# Patient Record
Sex: FEMALE | Race: BLACK OR AFRICAN AMERICAN | NOT HISPANIC OR LATINO | URBAN - METROPOLITAN AREA
[De-identification: names, ages, dates, MRNs, and addresses within clinical notes are randomized per-mention and may not be internally consistent; named-entity substitution may affect disease eponyms.]

---

## 2020-01-15 ENCOUNTER — OUTPATIENT (OUTPATIENT)
Dept: INPATIENT UNIT | Facility: HOSPITAL | Age: 25
LOS: 1 days | Discharge: HOME | End: 2020-01-15

## 2020-01-15 VITALS — DIASTOLIC BLOOD PRESSURE: 57 MMHG | SYSTOLIC BLOOD PRESSURE: 104 MMHG | HEART RATE: 84 BPM

## 2020-01-15 VITALS
HEART RATE: 105 BPM | SYSTOLIC BLOOD PRESSURE: 120 MMHG | DIASTOLIC BLOOD PRESSURE: 69 MMHG | TEMPERATURE: 99 F | RESPIRATION RATE: 16 BRPM

## 2020-01-15 DIAGNOSIS — O03.9 COMPLETE OR UNSPECIFIED SPONTANEOUS ABORTION WITHOUT COMPLICATION: Chronic | ICD-10-CM

## 2020-01-15 DIAGNOSIS — O26.899 OTHER SPECIFIED PREGNANCY RELATED CONDITIONS, UNSPECIFIED TRIMESTER: ICD-10-CM

## 2020-01-15 DIAGNOSIS — Z3A.00 WEEKS OF GESTATION OF PREGNANCY NOT SPECIFIED: ICD-10-CM

## 2020-01-15 NOTE — OB PROVIDER TRIAGE NOTE - ADDITIONAL INSTRUCTIONS
-No evidence of active labor at this time  -D/w Dr. Coty Salmon / Dr. De La Cruz  -Labor precautions/ fetal kick counts reviewed with patient; pt to return if decreased movement, leakage of fluid, vaginal bleeding, or contractions  -Pt to f/u with OB provider as scheduled (1/16/20)

## 2020-01-15 NOTE — OB PROVIDER TRIAGE NOTE - NSOBPROVIDERNOTE_OBGYN_ALL_OB_FT
Default patient: Pt receives care from [facility] in [facility], with Dr. Aakash Huynh.    Pt is a 25yo  at 40wks, who presents to triage with contractions since 550pm. Denies any vaginal bleeding or leakage of fluid. Reports good fetal movement.    Allergies: denies  Meds: PNV  PMH: denies  PSH: D&C x 1  OBGYN  -TOP x 1, D&C  -Denies any current AP complications    Assessment/Plan    VS: /60, HR 86, T 37.2  Abd soft, nontender, gravid  SVE: /-3  TAS: posterior placenta, vertex, KAYLA=12.91cm; BPP=8/8  NST: , moderate variability, accels, no decels  Ctx q 5-6minutes Default patient: Pt receives care from [facility] in [facility], with Dr. Aakash Huynh.    Pt is a 23yo  at 40wks, who presents to triage with contractions since 550pm. Denies any vaginal bleeding or leakage of fluid. Reports good fetal movement.    Allergies: denies  Meds: PNV  PMH: denies  PSH: D&C x 1  OBGYN  -TOP x 1, D&C  -Denies any current AP complications    Assessment/Plan    VS: /60, HR 86, T 37.2  Abd soft, nontender, gravid  SVE: /-3  TAS: posterior placenta, vertex, KAYLA=12.91cm; BPP=8/8  NST: , moderate variability, accels, no decels  Ctx q 5-6minutes    -No evidence of active labor at this time  -D/w Dr. Coty Salmon / Dr. De La Cruz  -Labor precautions/ fetal kick counts reviewed with patient; pt to return if decreased movement, leakage of fluid, vaginal bleeding, or contractions  -Pt to f/u with OB provider as scheduled (20)

## 2020-01-15 NOTE — OB PROVIDER TRIAGE NOTE - NSHPPHYSICALEXAM_GEN_ALL_CORE
VS: /60, HR 86, T 37.2  Abd soft, nontender, gravid  SVE: 1/60/-3  TAS: posterior placenta, vertex, KAYLA=12.91cm; BPP=8/8  NST: , moderate variability, accels, no decels  Ctx q 5-6minutes

## 2020-01-15 NOTE — OB PROVIDER TRIAGE NOTE - HISTORY OF PRESENT ILLNESS
Default patient: Pt receives care from [facility] in [facility], with Dr. Aakash Huynh.    Pt is a 25yo  at 40wks, who presents to triage with contractions since 550pm. Denies any vaginal bleeding or leakage of fluid. Reports good fetal movement.    Allergies: denies  Meds: PNV  PMH: denies  PSH: D&C x 1  OBGYN  -TOP x 1, D&C  -Denies any current AP complications

## 2020-01-16 ENCOUNTER — INPATIENT (INPATIENT)
Facility: HOSPITAL | Age: 25
LOS: 1 days | Discharge: HOME | End: 2020-01-18
Attending: SPECIALIST | Admitting: SPECIALIST
Payer: MEDICAID

## 2020-01-16 VITALS
SYSTOLIC BLOOD PRESSURE: 119 MMHG | DIASTOLIC BLOOD PRESSURE: 77 MMHG | HEART RATE: 92 BPM | RESPIRATION RATE: 17 BRPM | TEMPERATURE: 99 F

## 2020-01-16 DIAGNOSIS — O03.9 COMPLETE OR UNSPECIFIED SPONTANEOUS ABORTION WITHOUT COMPLICATION: Chronic | ICD-10-CM

## 2020-01-16 DIAGNOSIS — O26.899 OTHER SPECIFIED PREGNANCY RELATED CONDITIONS, UNSPECIFIED TRIMESTER: ICD-10-CM

## 2020-01-16 DIAGNOSIS — Z3A.00 WEEKS OF GESTATION OF PREGNANCY NOT SPECIFIED: ICD-10-CM

## 2020-01-16 LAB
AMPHET UR-MCNC: NEGATIVE — SIGNIFICANT CHANGE UP
BARBITURATES UR SCN-MCNC: NEGATIVE — SIGNIFICANT CHANGE UP
BASOPHILS # BLD AUTO: 0.01 K/UL — SIGNIFICANT CHANGE UP (ref 0–0.2)
BASOPHILS NFR BLD AUTO: 0.1 % — SIGNIFICANT CHANGE UP (ref 0–2)
BASOPHILS NFR SPEC: 0 % — SIGNIFICANT CHANGE UP (ref 0–2)
BENZODIAZ UR-MCNC: NEGATIVE — SIGNIFICANT CHANGE UP
BLASTS # FLD: 0 % — SIGNIFICANT CHANGE UP (ref 0–0)
BLD GP AB SCN SERPL QL: NEGATIVE — SIGNIFICANT CHANGE UP
CANNABINOIDS UR-MCNC: NEGATIVE — SIGNIFICANT CHANGE UP
COCAINE METAB.OTHER UR-MCNC: NEGATIVE — SIGNIFICANT CHANGE UP
EOSINOPHIL # BLD AUTO: 0 K/UL — SIGNIFICANT CHANGE UP (ref 0–0.5)
EOSINOPHIL NFR BLD AUTO: 0 % — SIGNIFICANT CHANGE UP (ref 0–6)
EOSINOPHIL NFR FLD: 0 % — SIGNIFICANT CHANGE UP (ref 0–6)
GIANT PLATELETS BLD QL SMEAR: PRESENT — SIGNIFICANT CHANGE UP
HCT VFR BLD CALC: 34.3 % — LOW (ref 34.5–45)
HGB BLD-MCNC: 11.5 G/DL — SIGNIFICANT CHANGE UP (ref 11.5–15.5)
HIV COMBO RESULT: SIGNIFICANT CHANGE UP
HIV1+2 AB SPEC QL: SIGNIFICANT CHANGE UP
IMM GRANULOCYTES NFR BLD AUTO: 0.4 % — SIGNIFICANT CHANGE UP (ref 0–1.5)
LYMPHOCYTES # BLD AUTO: 0.44 K/UL — LOW (ref 1–3.3)
LYMPHOCYTES # BLD AUTO: 3.6 % — LOW (ref 13–44)
LYMPHOCYTES NFR SPEC AUTO: 1.7 % — LOW (ref 13–44)
MCHC RBC-ENTMCNC: 31.9 PG — SIGNIFICANT CHANGE UP (ref 27–34)
MCHC RBC-ENTMCNC: 33.5 % — SIGNIFICANT CHANGE UP (ref 32–36)
MCV RBC AUTO: 95.3 FL — SIGNIFICANT CHANGE UP (ref 80–100)
METAMYELOCYTES # FLD: 0 % — SIGNIFICANT CHANGE UP (ref 0–1)
METHADONE UR-MCNC: NEGATIVE — SIGNIFICANT CHANGE UP
MONOCYTES # BLD AUTO: 0.21 K/UL — SIGNIFICANT CHANGE UP (ref 0–0.9)
MONOCYTES NFR BLD AUTO: 1.7 % — LOW (ref 2–14)
MONOCYTES NFR BLD: 0 % — LOW (ref 2–9)
MYELOCYTES NFR BLD: 0 % — SIGNIFICANT CHANGE UP (ref 0–0)
NEUTROPHIL AB SER-ACNC: 96.5 % — HIGH (ref 43–77)
NEUTROPHILS # BLD AUTO: 11.5 K/UL — HIGH (ref 1.8–7.4)
NEUTROPHILS NFR BLD AUTO: 94.2 % — HIGH (ref 43–77)
NEUTS BAND # BLD: 1.8 % — SIGNIFICANT CHANGE UP (ref 0–6)
NRBC # FLD: 0 K/UL — SIGNIFICANT CHANGE UP (ref 0–0)
OPIATES UR-MCNC: NEGATIVE — SIGNIFICANT CHANGE UP
OTHER - HEMATOLOGY %: 0 — SIGNIFICANT CHANGE UP
OXYCODONE UR-MCNC: NEGATIVE — SIGNIFICANT CHANGE UP
PCP UR-MCNC: NEGATIVE — SIGNIFICANT CHANGE UP
PLATELET # BLD AUTO: 229 K/UL — SIGNIFICANT CHANGE UP (ref 150–400)
PLATELET COUNT - ESTIMATE: NORMAL — SIGNIFICANT CHANGE UP
PMV BLD: 10.7 FL — SIGNIFICANT CHANGE UP (ref 7–13)
PROMYELOCYTES # FLD: 0 % — SIGNIFICANT CHANGE UP (ref 0–0)
RBC # BLD: 3.6 M/UL — LOW (ref 3.8–5.2)
RBC # FLD: 13.1 % — SIGNIFICANT CHANGE UP (ref 10.3–14.5)
RH IG SCN BLD-IMP: POSITIVE — SIGNIFICANT CHANGE UP
RH IG SCN BLD-IMP: POSITIVE — SIGNIFICANT CHANGE UP
T PALLIDUM AB TITR SER: NEGATIVE — SIGNIFICANT CHANGE UP
VARIANT LYMPHS # BLD: 0 % — SIGNIFICANT CHANGE UP
WBC # BLD: 12.21 K/UL — HIGH (ref 3.8–10.5)
WBC # FLD AUTO: 12.21 K/UL — HIGH (ref 3.8–10.5)

## 2020-01-16 PROCEDURE — 59409 OBSTETRICAL CARE: CPT | Mod: U9,UB,GC

## 2020-01-16 RX ORDER — DIBUCAINE 1 %
1 OINTMENT (GRAM) RECTAL EVERY 6 HOURS
Refills: 0 | Status: DISCONTINUED | OUTPATIENT
Start: 2020-01-16 | End: 2020-01-18

## 2020-01-16 RX ORDER — SODIUM CHLORIDE 9 MG/ML
3 INJECTION INTRAMUSCULAR; INTRAVENOUS; SUBCUTANEOUS EVERY 8 HOURS
Refills: 0 | Status: DISCONTINUED | OUTPATIENT
Start: 2020-01-16 | End: 2020-01-18

## 2020-01-16 RX ORDER — OXYTOCIN 10 UNIT/ML
333.33 VIAL (ML) INJECTION
Qty: 20 | Refills: 0 | Status: DISCONTINUED | OUTPATIENT
Start: 2020-01-16 | End: 2020-01-17

## 2020-01-16 RX ORDER — LANOLIN
1 OINTMENT (GRAM) TOPICAL EVERY 6 HOURS
Refills: 0 | Status: DISCONTINUED | OUTPATIENT
Start: 2020-01-16 | End: 2020-01-18

## 2020-01-16 RX ORDER — AER TRAVELER 0.5 G/1
1 SOLUTION RECTAL; TOPICAL EVERY 4 HOURS
Refills: 0 | Status: DISCONTINUED | OUTPATIENT
Start: 2020-01-16 | End: 2020-01-18

## 2020-01-16 RX ORDER — OXYTOCIN 10 UNIT/ML
333.33 VIAL (ML) INJECTION
Qty: 20 | Refills: 0 | Status: DISCONTINUED | OUTPATIENT
Start: 2020-01-16 | End: 2020-01-16

## 2020-01-16 RX ORDER — BENZOCAINE 10 %
1 GEL (GRAM) MUCOUS MEMBRANE EVERY 6 HOURS
Refills: 0 | Status: DISCONTINUED | OUTPATIENT
Start: 2020-01-16 | End: 2020-01-18

## 2020-01-16 RX ORDER — SIMETHICONE 80 MG/1
80 TABLET, CHEWABLE ORAL EVERY 4 HOURS
Refills: 0 | Status: DISCONTINUED | OUTPATIENT
Start: 2020-01-16 | End: 2020-01-18

## 2020-01-16 RX ORDER — TETANUS TOXOID, REDUCED DIPHTHERIA TOXOID AND ACELLULAR PERTUSSIS VACCINE, ADSORBED 5; 2.5; 8; 8; 2.5 [IU]/.5ML; [IU]/.5ML; UG/.5ML; UG/.5ML; UG/.5ML
0.5 SUSPENSION INTRAMUSCULAR ONCE
Refills: 0 | Status: DISCONTINUED | OUTPATIENT
Start: 2020-01-16 | End: 2020-01-18

## 2020-01-16 RX ORDER — SODIUM CHLORIDE 9 MG/ML
1000 INJECTION, SOLUTION INTRAVENOUS
Refills: 0 | Status: DISCONTINUED | OUTPATIENT
Start: 2020-01-16 | End: 2020-01-16

## 2020-01-16 RX ORDER — IBUPROFEN 200 MG
600 TABLET ORAL EVERY 6 HOURS
Refills: 0 | Status: COMPLETED | OUTPATIENT
Start: 2020-01-16 | End: 2020-12-14

## 2020-01-16 RX ORDER — AMPICILLIN TRIHYDRATE 250 MG
CAPSULE ORAL
Refills: 0 | Status: DISCONTINUED | OUTPATIENT
Start: 2020-01-16 | End: 2020-01-17

## 2020-01-16 RX ORDER — ACETAMINOPHEN 500 MG
975 TABLET ORAL
Refills: 0 | Status: DISCONTINUED | OUTPATIENT
Start: 2020-01-16 | End: 2020-01-18

## 2020-01-16 RX ORDER — GENTAMICIN SULFATE 40 MG/ML
315 VIAL (ML) INJECTION ONCE
Refills: 0 | Status: DISCONTINUED | OUTPATIENT
Start: 2020-01-16 | End: 2020-01-17

## 2020-01-16 RX ORDER — AMPICILLIN TRIHYDRATE 250 MG
2 CAPSULE ORAL EVERY 6 HOURS
Refills: 0 | Status: DISCONTINUED | OUTPATIENT
Start: 2020-01-17 | End: 2020-01-17

## 2020-01-16 RX ORDER — INFLUENZA VIRUS VACCINE 15; 15; 15; 15 UG/.5ML; UG/.5ML; UG/.5ML; UG/.5ML
0.5 SUSPENSION INTRAMUSCULAR ONCE
Refills: 0 | Status: DISCONTINUED | OUTPATIENT
Start: 2020-01-16 | End: 2020-01-18

## 2020-01-16 RX ORDER — HYDROCORTISONE 1 %
1 OINTMENT (GRAM) TOPICAL EVERY 6 HOURS
Refills: 0 | Status: DISCONTINUED | OUTPATIENT
Start: 2020-01-16 | End: 2020-01-18

## 2020-01-16 RX ORDER — PRAMOXINE HYDROCHLORIDE 150 MG/15G
1 AEROSOL, FOAM RECTAL EVERY 4 HOURS
Refills: 0 | Status: DISCONTINUED | OUTPATIENT
Start: 2020-01-16 | End: 2020-01-18

## 2020-01-16 RX ORDER — MAGNESIUM HYDROXIDE 400 MG/1
30 TABLET, CHEWABLE ORAL
Refills: 0 | Status: DISCONTINUED | OUTPATIENT
Start: 2020-01-16 | End: 2020-01-18

## 2020-01-16 RX ORDER — OXYTOCIN 10 UNIT/ML
2 VIAL (ML) INJECTION
Qty: 30 | Refills: 0 | Status: DISCONTINUED | OUTPATIENT
Start: 2020-01-16 | End: 2020-01-17

## 2020-01-16 RX ORDER — KETOROLAC TROMETHAMINE 30 MG/ML
30 SYRINGE (ML) INJECTION ONCE
Refills: 0 | Status: DISCONTINUED | OUTPATIENT
Start: 2020-01-16 | End: 2020-01-16

## 2020-01-16 RX ORDER — OXYCODONE HYDROCHLORIDE 5 MG/1
5 TABLET ORAL ONCE
Refills: 0 | Status: DISCONTINUED | OUTPATIENT
Start: 2020-01-16 | End: 2020-01-18

## 2020-01-16 RX ORDER — AMPICILLIN TRIHYDRATE 250 MG
2 CAPSULE ORAL ONCE
Refills: 0 | Status: COMPLETED | OUTPATIENT
Start: 2020-01-16 | End: 2020-01-16

## 2020-01-16 RX ORDER — GLYCERIN ADULT
1 SUPPOSITORY, RECTAL RECTAL AT BEDTIME
Refills: 0 | Status: DISCONTINUED | OUTPATIENT
Start: 2020-01-16 | End: 2020-01-18

## 2020-01-16 RX ORDER — ACETAMINOPHEN 500 MG
975 TABLET ORAL ONCE
Refills: 0 | Status: COMPLETED | OUTPATIENT
Start: 2020-01-16 | End: 2020-01-16

## 2020-01-16 RX ORDER — SODIUM CHLORIDE 9 MG/ML
500 INJECTION, SOLUTION INTRAVENOUS ONCE
Refills: 0 | Status: COMPLETED | OUTPATIENT
Start: 2020-01-16 | End: 2020-01-16

## 2020-01-16 RX ORDER — OXYCODONE HYDROCHLORIDE 5 MG/1
5 TABLET ORAL
Refills: 0 | Status: DISCONTINUED | OUTPATIENT
Start: 2020-01-16 | End: 2020-01-18

## 2020-01-16 RX ORDER — OXYTOCIN 10 UNIT/ML
2 VIAL (ML) INJECTION
Qty: 30 | Refills: 0 | Status: DISCONTINUED | OUTPATIENT
Start: 2020-01-16 | End: 2020-01-16

## 2020-01-16 RX ORDER — DIPHENHYDRAMINE HCL 50 MG
25 CAPSULE ORAL EVERY 6 HOURS
Refills: 0 | Status: DISCONTINUED | OUTPATIENT
Start: 2020-01-16 | End: 2020-01-18

## 2020-01-16 RX ADMIN — Medication 1000 MILLIUNIT(S)/MIN: at 18:57

## 2020-01-16 RX ADMIN — SODIUM CHLORIDE 1000 MILLILITER(S): 9 INJECTION, SOLUTION INTRAVENOUS at 06:55

## 2020-01-16 RX ADMIN — Medication 216 GRAM(S): at 18:00

## 2020-01-16 RX ADMIN — Medication 975 MILLIGRAM(S): at 18:00

## 2020-01-16 RX ADMIN — SODIUM CHLORIDE 3 MILLILITER(S): 9 INJECTION INTRAMUSCULAR; INTRAVENOUS; SUBCUTANEOUS at 22:34

## 2020-01-16 RX ADMIN — Medication 2 MILLIUNIT(S)/MIN: at 09:55

## 2020-01-16 RX ADMIN — Medication 30 MILLIGRAM(S): at 20:52

## 2020-01-16 RX ADMIN — Medication 30 MILLIGRAM(S): at 20:37

## 2020-01-16 NOTE — OB PROVIDER IHI INDUCTION/AUGMENTATION NOTE - NS_CHECKALL_OBGYN_ALL_OB
Contractions pattern was reviewed/FHR was reviewed/H&P was completed/Order was written/Induction / Augmentation was discussed

## 2020-01-16 NOTE — OB PROVIDER H&P - NSHPPHYSICALEXAM_GEN_ALL_CORE
Vital Signs Last 24 Hrs  T(C): 37.2 (16 Jan 2020 06:24), Max: 37.2 (15 Gilberto 2020 22:00)  T(F): 98.96 (16 Jan 2020 06:24), Max: 99 (15 Gilberto 2020 22:00)  HR: 92 (16 Jan 2020 06:25) (84 - 105)  BP: 119/77 (16 Jan 2020 06:25) (104/57 - 120/69)  BP(mean): --  RR: 17 (16 Jan 2020 06:15) (16 - 17)  SpO2: --    regular heart rate; rhythm   lungs CTA     abdomen soft nontender gravid  (+) FHR; moderate variability; with accelerations  irregular uterine contractions noted on tocometer    Sterile Speculum Exam: (+) pooling, (+) nitrazine, (+) ferning  Vaginal Exam: 5/70/-3  forebag palpable

## 2020-01-16 NOTE — CHART NOTE - NSCHARTNOTEFT_GEN_A_CORE
NP note    Pt seen for cervical evaluation. Mild pressure with ctx.     Vital Signs Last 24 Hrs  T(C): 36.8 (16 Jan 2020 15:35), Max: 37.3 (16 Jan 2020 07:21)  T(F): 98.24 (16 Jan 2020 15:35), Max: 99.14 (16 Jan 2020 07:21)  HR: 95 (16 Jan 2020 17:04) (76 - 107)  BP: 109/55 (16 Jan 2020 16:59) (86/49 - 129/64)  BP(mean): --  RR: 15 (16 Jan 2020 15:35) (15 - 17)  SpO2: 100% (16 Jan 2020 16:59) (94% - 100%)  EFM Cat I   Arion irr  SVE 9.5/100/+1    -Cont pitocin  -Cont labor down  -Dr. Craft/Dr. Marie law, NP

## 2020-01-16 NOTE — OB PROVIDER DELIVERY SUMMARY - NSPROVIDERDELIVERYNOTE_OBGYN_ALL_OB_FT
Pt found to be fully dilated with urge to push, pushed 20 minutes with  of viable female infant. Head, shoulders and body delivered easily in TIMOTHY position. Pediatrics at delivery for heavy mec and maternal temp. Placenta delivered intact. Vaginal exam revealed intact cervix, vaginal walls, sulci. 2nd degree laceration identified and repaired in usual fashion with 2.0 and 3.0 chromic suture. Bimanual exam performed, with minimal clot evacuated. Fundus and lower uterine segment firm. Excellent hemostasis.  Bakari PGY1 Pt found to be fully dilated with urge to push, pushed 20 minutes with  of viable female infant. Head, shoulders and body delivered easily in TIMOTHY position. Pediatrics at delivery for heavy mec and maternal temp. Placenta delivered intact. Vaginal exam revealed intact cervix, vaginal walls, sulci. 2nd degree laceration identified and repaired in usual fashion with 2.0 and 3.0 chromic suture. Bimanual exam performed, with clot evacuated. Cytotec given per rectum for atony. Fundus and lower uterine segment firm. Excellent hemostasis.  Bakari PGY1    I was present and supervised delivery. ROMIE Salazar MD

## 2020-01-16 NOTE — CHART NOTE - NSCHARTNOTEFT_GEN_A_CORE
NP note    Pt seen for increased pain    Vital Signs Last 24 Hrs  T(C): 37.1 (16 Jan 2020 14:17), Max: 37.3 (16 Jan 2020 07:21)  T(F): 98.78 (16 Jan 2020 14:17), Max: 99.14 (16 Jan 2020 07:21)  HR: 91 (16 Jan 2020 14:19) (76 - 107)  BP: 111/59 (16 Jan 2020 14:13) (86/49 - 126/82)  RR: 16 (16 Jan 2020 14:17) (15 - 17)  SpO2: 100% (16 Jan 2020 14:19) (96% - 100%)    /mod jacinta/+ accels/occ late/early decels pt repositioned  [place] q2-4min  SVE 9/100/-1    Approved for top off  Cont pitocin    thanh, NP

## 2020-01-16 NOTE — OB PROVIDER TRIAGE NOTE - NSHPPHYSICALEXAM_GEN_ALL_CORE
Vital Signs Last 24 Hrs  T(C): 37.2 (16 Jan 2020 06:24), Max: 37.2 (15 Gilberto 2020 22:00)  T(F): 98.96 (16 Jan 2020 06:24), Max: 99 (15 Gilberto 2020 22:00)  HR: 92 (16 Jan 2020 06:25) (84 - 105)  BP: 119/77 (16 Jan 2020 06:25) (104/57 - 120/69)  BP(mean): --  RR: 17 (16 Jan 2020 06:15) (16 - 17)  SpO2: --    regular heart rate; rhythm   lungs CTA     abdomen soft nontender gravid  (+) FHR; moderate variability; with accelerations  irregular uterine contractions noted on tocometer    Sterile Speculum Exam: (+) pooling, (-) nitrazine, () ferning  Vaginal Exam: 5/70/-3  forebag palpable

## 2020-01-16 NOTE — OB RN DELIVERY SUMMARY - NS_SEPSISRSKCALC_OBGYN_ALL_OB_FT
EOS calculated successfully. EOS Risk Factor: 0.5/1000 live births ([facility] national incidence); GA=40w1d; Temp=100.94; ROM=14.167; GBS='Negative'; Antibiotics='No antibiotics or any antibiotics < 2 hrs prior to birth'

## 2020-01-16 NOTE — OB PROVIDER TRIAGE NOTE - HISTORY OF PRESENT ILLNESS
24y.o.   at  40.1weeks presenting with complaints of increased uterine contractions since 0200 today occurring irregularly.  Patient reports 10/10 pain and is presently requesting pain management.  Patient was seen and evaluated throughout the course of the evening and was noted to be 1cm.  Patient reports experiencing leakage of fluid since 0400 clear.  Patient reports positive fetal movement, denies vaginal bleeding.    patient receives care at [facility] from [facility] w/ Dr. Aakash Huynh

## 2020-01-16 NOTE — OB PROVIDER H&P - HISTORY OF PRESENT ILLNESS
24y.o.   at  40.1weeks presenting with complaints of increased uterine contractions since 0200 today occurring irregularly.  Patient reports 10/10 pain and is presently requesting pain management.  Patient was seen and evaluated throughout the course of the evening and was noted to be 1cm.  Patient reports experiencing leakage of fluid since 0400 clear.  Patient reports positive fetal movement, denies vaginal bleeding.    patient receives care at [facility] from [facility] w/ Dr. Aakash Huynh  GBS (-) 2019	    a/p complications patient denies

## 2020-01-16 NOTE — CHART NOTE - NSCHARTNOTEFT_GEN_A_CORE
PGY3 Progress Note    Subjective  At pt bedside for late decelerations. Pt reexamined. SVE as below. Forebag ruptured w/ thick mec. Pt placed on side with O2 applied and fluid running for resuscitation. Pt is comfortable w/ epidural. Pt denies any other concerns.    Objective  T(C): 36.6 (01-16-20 @ 11:45), Max: 37.3 (01-16-20 @ 07:21)  HR: 88 (01-16-20 @ 12:29) (76 - 107)  BP: 104/70 (01-16-20 @ 12:27) (86/49 - 126/82)  RR: 15 (01-16-20 @ 11:45) (15 - 17)  SpO2: 100% (01-16-20 @ 12:29) (96% - 100%)  SVE: 7.5/100/-2  FHT: baseline 130, mod variability, +accels, + late decels  [place]: q3-5min    Assessment  in active labor    Plan  - continue uptitrating pitocin        Discussed with attending physician, Dr. Craft.    Mary Mata MD PGY3

## 2020-01-16 NOTE — OB PROVIDER H&P - ASSESSMENT
pmh: denies  psh: denies  obhx: TOP w/ D&C 2019  gynhx: denies    NKDA    Medications:   PNV    Assessment  Vital Signs Last 24 Hrs  T(C): 37.2 (16 Jan 2020 06:24), Max: 37.2 (15 Gilberto 2020 22:00)  T(F): 98.96 (16 Jan 2020 06:24), Max: 99 (15 Gilberto 2020 22:00)  HR: 92 (16 Jan 2020 06:25) (84 - 105)  BP: 119/77 (16 Jan 2020 06:25) (104/57 - 120/69)  BP(mean): --  RR: 17 (16 Jan 2020 06:15) (16 - 17)  SpO2: --    regular heart rate; rhythm   lungs CTA     abdomen soft nontender gravid  (+) FHR; moderate variability; with accelerations  irregular uterine contractions noted on tocometer    Sterile Speculum Exam: (+) pooling, (+) nitrazine, (+) ferning  Vaginal Exam: 5/70/-3  forebag palpable    GBS (-)     Plan  Admit to Labor and Delivery for ROM in Labor  Routine Admission Labs  Pending HIV & Urine Drugs of Abuse   Epidural for pain management  Expectant Management       D/w Dr. Salmon PGY-4, Gregory FRANKLIN

## 2020-01-16 NOTE — CHART NOTE - NSCHARTNOTEFT_GEN_A_CORE
R1 Note 01-16-20 @ 09:37    Pt seen for progression   Comfortable with epidural    VITALS:  T(C): 37.3 (01-16-20 @ 08:06), Max: 37.3 (01-16-20 @ 07:21)  HR: 93 (01-16-20 @ 09:34) (76 - 105)  BP: 110/71 (01-16-20 @ 09:28) (86/49 - 126/82)  RR: 16 (01-16-20 @ 08:06) (16 - 17)  SpO2: 100% (01-16-20 @ 09:29) (96% - 100%)    EFM:  mod jacinta +accels -decels  [place]: Ctx no reg pattern on toco   VE:  6/80/-2    IMPRESSION:   FHR Category: 1  Additional:    PLAN:  Pit 2x2      d/w Viky PGY4  ZITA Villegas PGY1

## 2020-01-16 NOTE — OB NEONATOLOGY/PEDIATRICIAN DELIVERY SUMMARY - NSPEDSNEONOTESA_OBGYN_ALL_OB_FT
23 yo  mom with no significant past medical hx delivered at 40+1 weeks. Delivery course significant for maternal temp of 38.3 C about 30 mins prior to delivery. Received amp and gent just before delivery. Maternal labs : O+/NNI/GBS negative .  SROM 0400, about 14 hours prior to delivery. EOS 1.22  Baby born vigorous, mouth and nose suctioned, delayed cord clamping performed for 1 minute. RN to assign 5 minute APGAR score. Due to EOS >1, infant will be admitted to NICU for 6 hour observation  per protocol. Parents informed about plan of care and indication for admission. Mom wants to breast and bottle feed, wants Hep B, PMD Dr Maddox

## 2020-01-17 ENCOUNTER — TRANSCRIPTION ENCOUNTER (OUTPATIENT)
Age: 25
End: 2020-01-17

## 2020-01-17 RX ORDER — IBUPROFEN 200 MG
600 TABLET ORAL EVERY 6 HOURS
Refills: 0 | Status: DISCONTINUED | OUTPATIENT
Start: 2020-01-17 | End: 2020-01-18

## 2020-01-17 RX ADMIN — Medication 600 MILLIGRAM(S): at 18:20

## 2020-01-17 RX ADMIN — Medication 600 MILLIGRAM(S): at 17:43

## 2020-01-17 RX ADMIN — Medication 600 MILLIGRAM(S): at 07:08

## 2020-01-17 RX ADMIN — Medication 600 MILLIGRAM(S): at 11:53

## 2020-01-17 RX ADMIN — SODIUM CHLORIDE 3 MILLILITER(S): 9 INJECTION INTRAMUSCULAR; INTRAVENOUS; SUBCUTANEOUS at 06:23

## 2020-01-17 RX ADMIN — Medication 600 MILLIGRAM(S): at 12:39

## 2020-01-17 RX ADMIN — Medication 600 MILLIGRAM(S): at 06:27

## 2020-01-17 NOTE — PROGRESS NOTE ADULT - ATTENDING COMMENTS
Associate chief of L & D(late entry)    This patient is unfamiliar to me and I have not met her before today.  She was admitted by Sr. Jenkins in labor and delivered uncomplicated vaginally she denies any complications and is doing well today   VS  T(C): 37 (20 @ 05:42)  HR: 95 (20 @ 05:42)  BP: 121/78 (20 @ 05:42)  RR: 18 (20 @ 05:42)  SpO2: 100% (20 @ 05:42)  Abd soft fundus firm   lochia scant   ext: traqce  IMP:s/p  and repair of 2nd degree laceration   Stable   continue routine PP care    Earlene Bejarano M.D.

## 2020-01-17 NOTE — DISCHARGE NOTE OB - MATERIALS PROVIDED
Bottle Feeding Log/Guide to Postpartum Care/[facility] Hearing Screen Program/  Immunization Record/Breastfeeding Log/Breastfeeding Mother’s Support Group Information/[facility]  Screening Program/Breastfeeding Guide and Packet/Birth Certificate Instructions/Back To Sleep Handout/Discharge Medication Information for Patients and Families Pocket Guide/Vaccinations/Shaken Baby Prevention Handout

## 2020-01-17 NOTE — PROGRESS NOTE ADULT - SUBJECTIVE AND OBJECTIVE BOX
OB Progress Note:  PPD#1    S: 25yo  PPD#1 s/p . Patient feels well. Pain is well controlled. She is tolerating a regular diet and passing flatus. She is voiding spontaneously, and ambulating without difficulty. Denies CP/SOB. Denies lightheadedness/dizziness. Denies N/V. Denies sxs of PEC: denies headache, visual disturbances, RUQ pain, respiratory distress    O:  Vitals:  Vital Signs Last 24 Hrs  T(C): 36.9 (2020 01:57), Max: 38.3 (2020 17:36)  T(F): 98.5 (2020 01:57), Max: 100.94 (2020 17:36)  HR: 89 (2020 01:57) (76 - 119)  BP: 95/54 (2020 01:57) (86/49 - 129/75)  BP(mean): --  RR: 18 (2020 01:57) (15 - 18)  SpO2: 100% (:57) (84% - 100%)    MEDICATIONS  (STANDING):  acetaminophen   Tablet .. 975 milliGRAM(s) Oral <User Schedule>  ampicillin  IVPB 2 Gram(s) IV Intermittent every 6 hours  ampicillin  IVPB      diphtheria/tetanus/pertussis (acellular) Vaccine (ADAcel) 0.5 milliLiter(s) IntraMuscular once  gentamicin   IVPB 315 milliGRAM(s) IV Intermittent once  ibuprofen  Tablet. 600 milliGRAM(s) Oral every 6 hours  influenza   Vaccine 0.5 milliLiter(s) IntraMuscular once  oxytocin Infusion 333.333 milliUNIT(s)/Min (1000 mL/Hr) IV Continuous <Continuous>  oxytocin Infusion 2 milliUNIT(s)/Min (2 mL/Hr) IV Continuous <Continuous>  prenatal multivitamin 1 Tablet(s) Oral daily  sodium chloride 0.9% lock flush 3 milliLiter(s) IV Push every 8 hours      Labs:  Blood type: O Positive  Rubella IgG: RPR: Negative                          11.5   12.21<H> >-----------< 229    ( 16 @ 06:50 )             34.3<L>          Physical Exam:  General: NAD  Abdomen: soft, non-tender, non-distended, fundus firm  Vaginal: Lochia wnl  Extremities: No erythema/edema

## 2020-01-17 NOTE — PROGRESS NOTE ADULT - SUBJECTIVE AND OBJECTIVE BOX
Pt. assessed post-delivery. Pt. denies headache, is able to walk independently, is able to tolerate food and has been voiding. Mother doing well.

## 2020-01-17 NOTE — DISCHARGE NOTE OB - MEDICATION SUMMARY - MEDICATIONS TO TAKE
I will START or STAY ON the medications listed below when I get home from the hospital:    ibuprofen 600 mg oral tablet  -- 1 tab(s) by mouth every 6 hours, As Needed  -- Indication: For for pain    acetaminophen 325 mg oral tablet  -- 3 tab(s) by mouth , As Needed  -- Indication: For for pain

## 2020-01-17 NOTE — LACTATION INITIAL EVALUATION - LACTATION INTERVENTIONS
Pt. declined assistance at this time. states "going fine".  Pt. informed of availability of lactation through the night and encouraged to call for assistance prn. RN made aware of plan and to assist with further feedings as necessary. Instructed to request assistance prn.

## 2020-01-17 NOTE — PROGRESS NOTE ADULT - PROBLEM SELECTOR PLAN 1
- Pain well controlled, continue current pain regimen  - Increase ambulation, SCDs when not ambulating  - Continue regular diet    Fareed Clark PGY1

## 2020-01-17 NOTE — DISCHARGE NOTE OB - HOSPITAL COURSE
Patient had uncomplicated, nonsurgical vaginal delivery.  Please see delivery note for details.  During postpartum course patient's vitals were stable, vaginal bleeding appropriate, and pain well controlled.  On day of discharge patient was ambulating, her pain controlled with oral medications, had adequate oral intake, and was voiding freely.  Discharge instructions and precautions were given.  Will return to clinic in 6 weeks for postpartum visit.  She is breastfeeding and would like a PP IUD with no bridge contraception.

## 2020-01-17 NOTE — DISCHARGE NOTE OB - PATIENT PORTAL LINK FT
You can access the FollowMyHealth Patient Portal offered by [facility] by registering at the following website: [email or web address]. By joining [employer]’s FollowMyHealth portal, you will also be able to view your health information using other applications (apps) compatible with our system.

## 2020-01-17 NOTE — DISCHARGE NOTE OB - PROVIDER TOKENS
FREE:[LAST:[Amina],FIRST:[Monserrat],PHONE:[phone]],FAX:[(   [phone]    -],ADDRESS:[[facility]]]

## 2020-01-18 VITALS
SYSTOLIC BLOOD PRESSURE: 103 MMHG | OXYGEN SATURATION: 100 % | TEMPERATURE: 98 F | DIASTOLIC BLOOD PRESSURE: 59 MMHG | RESPIRATION RATE: 18 BRPM | HEART RATE: 83 BPM

## 2020-01-18 RX ORDER — ACETAMINOPHEN 500 MG
3 TABLET ORAL
Qty: 0 | Refills: 0 | DISCHARGE
Start: 2020-01-18

## 2020-01-18 RX ORDER — IBUPROFEN 200 MG
1 TABLET ORAL
Qty: 0 | Refills: 0 | DISCHARGE
Start: 2020-01-18

## 2020-01-18 RX ADMIN — Medication 600 MILLIGRAM(S): at 08:29

## 2020-01-18 RX ADMIN — Medication 600 MILLIGRAM(S): at 07:55

## 2020-01-18 RX ADMIN — Medication 975 MILLIGRAM(S): at 11:49

## 2020-01-18 RX ADMIN — Medication 600 MILLIGRAM(S): at 00:55

## 2020-01-18 RX ADMIN — Medication 975 MILLIGRAM(S): at 12:23

## 2020-01-18 RX ADMIN — Medication 600 MILLIGRAM(S): at 00:30

## 2020-01-18 NOTE — PROGRESS NOTE ADULT - PROBLEM SELECTOR PLAN 1
- Pain well controlled, continue current pain regimen  - Increase ambulation, SCDs when not ambulating  - Continue regular diet    Richard Steen PGY1 - Pain well controlled, continue current pain regimen  - Increase ambulation, SCDs when not ambulating  - Continue regular diet  - Discharge planning     Richard Steen PGY1

## 2020-01-18 NOTE — PROGRESS NOTE ADULT - SUBJECTIVE AND OBJECTIVE BOX
OB Progress Note:  PPD#1    S: 23yo  PPD#1 s/p , labor complicated by intrapartum fever s/p antibiotics. Patient feels well, denies fevers/chills. Pain is well controlled. She is tolerating a regular diet and passing flatus. She is voiding spontaneously, and ambulating without difficulty. Denies CP/SOB. Denies lightheadedness/dizziness. Denies N/V. Denies heavy vaginal bleeding. She is breastfeeding and would like a PP IUD with no bridge.     O:  Vitals:  Vital Signs Last 24 Hrs  T(C): 36.7 (2020 05:40), Max: 36.9 (2020 14:32)  T(F): 98 (2020 05:40), Max: 98.5 (2020 18:00)  HR: 83 (2020 05:40) (83 - 101)  BP: 103/59 (2020 05:40) (101/62 - 109/67)  BP(mean): --  RR: 18 (2020 05:40) (16 - 18)  SpO2: 100% (2020 05:40) (99% - 100%)    MEDICATIONS  (STANDING):  acetaminophen   Tablet .. 975 milliGRAM(s) Oral <User Schedule>  diphtheria/tetanus/pertussis (acellular) Vaccine (ADAcel) 0.5 milliLiter(s) IntraMuscular once  ibuprofen  Tablet. 600 milliGRAM(s) Oral every 6 hours  influenza   Vaccine 0.5 milliLiter(s) IntraMuscular once  prenatal multivitamin 1 Tablet(s) Oral daily  sodium chloride 0.9% lock flush 3 milliLiter(s) IV Push every 8 hours      Labs:  Blood type: O Positive  Rubella IgG: RPR: Negative                          11.5   12.21<H> >-----------< 229    ( 16 @ 06:50 )             34.3<L>              Physical Exam:  General: NAD  Abdomen: soft, non-tender, non-distended, fundus firm  Vaginal: Lochia wnl  Extremities: No erythema/edema OB Progress Note:  PPD#2    S: 23yo  PPD#2 s/p , labor complicated by intrapartum fever s/p antibiotics. Patient feels well, denies fevers/chills. Pain is well controlled. She is tolerating a regular diet and passing flatus. She is voiding spontaneously, and ambulating without difficulty. Denies CP/SOB. Denies lightheadedness/dizziness. Denies N/V. Denies heavy vaginal bleeding. She is breastfeeding and would like a PP IUD with no bridge.     O:  Vitals:  Vital Signs Last 24 Hrs  T(C): 36.7 (2020 05:40), Max: 36.9 (2020 14:32)  T(F): 98 (2020 05:40), Max: 98.5 (2020 18:00)  HR: 83 (2020 05:40) (83 - 101)  BP: 103/59 (2020 05:40) (101/62 - 109/67)  BP(mean): --  RR: 18 (2020 05:40) (16 - 18)  SpO2: 100% (2020 05:40) (99% - 100%)    MEDICATIONS  (STANDING):  acetaminophen   Tablet .. 975 milliGRAM(s) Oral <User Schedule>  diphtheria/tetanus/pertussis (acellular) Vaccine (ADAcel) 0.5 milliLiter(s) IntraMuscular once  ibuprofen  Tablet. 600 milliGRAM(s) Oral every 6 hours  influenza   Vaccine 0.5 milliLiter(s) IntraMuscular once  prenatal multivitamin 1 Tablet(s) Oral daily  sodium chloride 0.9% lock flush 3 milliLiter(s) IV Push every 8 hours      Labs:  Blood type: O Positive  Rubella IgG: RPR: Negative                          11.5   12.21<H> >-----------< 229    ( 16 @ 06:50 )             34.3<L>              Physical Exam:  General: NAD  Abdomen: soft, non-tender, non-distended, fundus firm  Vaginal: Lochia wnl  Extremities: No erythema/edema

## 2020-01-18 NOTE — PROGRESS NOTE ADULT - ASSESSMENT
25yo PPD#1 s/p .  Patient is stable and doing well post-partum. 25yo PPD#2 s/p .  Patient is stable and doing well post-partum.

## 2020-05-07 ENCOUNTER — OUTPATIENT (OUTPATIENT)
Dept: OUTPATIENT SERVICES | Facility: HOSPITAL | Age: 25
LOS: 1 days | Discharge: STILL ADMITTED OR ADMINISTRATIVE | End: 2020-05-07

## 2020-05-07 ENCOUNTER — INPATIENT (INPATIENT)
Facility: HOSPITAL | Age: 25
LOS: 6 days | Discharge: HOME | End: 2020-05-14
Attending: PSYCHIATRY & NEUROLOGY | Admitting: PSYCHIATRY & NEUROLOGY
Payer: MEDICAID

## 2020-05-07 VITALS — TEMPERATURE: 98 F | WEIGHT: 149.25 LBS | HEIGHT: 63 IN

## 2020-05-07 DIAGNOSIS — F33.2 MAJOR DEPRESSIVE DISORDER, RECURRENT SEVERE WITHOUT PSYCHOTIC FEATURES: ICD-10-CM

## 2020-05-07 DIAGNOSIS — O03.9 COMPLETE OR UNSPECIFIED SPONTANEOUS ABORTION WITHOUT COMPLICATION: Chronic | ICD-10-CM

## 2020-05-07 PROBLEM — Z78.9 OTHER SPECIFIED HEALTH STATUS: Chronic | Status: ACTIVE | Noted: 2020-01-15

## 2020-05-07 RX ORDER — DIPHENHYDRAMINE HCL 50 MG
50 CAPSULE ORAL EVERY 6 HOURS
Refills: 0 | Status: DISCONTINUED | OUTPATIENT
Start: 2020-05-07 | End: 2020-05-14

## 2020-05-07 RX ORDER — DIPHENHYDRAMINE HCL 50 MG
50 CAPSULE ORAL ONCE
Refills: 0 | Status: DISCONTINUED | OUTPATIENT
Start: 2020-05-07 | End: 2020-05-14

## 2020-05-07 RX ORDER — HALOPERIDOL DECANOATE 100 MG/ML
5 INJECTION INTRAMUSCULAR ONCE
Refills: 0 | Status: DISCONTINUED | OUTPATIENT
Start: 2020-05-07 | End: 2020-05-14

## 2020-05-07 RX ORDER — QUETIAPINE FUMARATE 200 MG/1
25 TABLET, FILM COATED ORAL AT BEDTIME
Refills: 0 | Status: DISCONTINUED | OUTPATIENT
Start: 2020-05-07 | End: 2020-05-08

## 2020-05-07 RX ORDER — HALOPERIDOL DECANOATE 100 MG/ML
5 INJECTION INTRAMUSCULAR EVERY 6 HOURS
Refills: 0 | Status: DISCONTINUED | OUTPATIENT
Start: 2020-05-07 | End: 2020-05-14

## 2020-05-07 RX ADMIN — QUETIAPINE FUMARATE 25 MILLIGRAM(S): 200 TABLET, FILM COATED ORAL at 20:59

## 2020-05-07 NOTE — CHART NOTE - NSCHARTNOTEFT_GEN_A_CORE
Screening Medical Evaluation  Patient Admitted from: Crisis Center    [facility] admitting diagnosis: Severe episode of recurrent major depressive disorder, without psychotic features    PAST MEDICAL & SURGICAL HISTORY:  No pertinent past medical history  , spontaneous        Allergies    No Known Allergies    Intolerances        Social History:     FAMILY HISTORY:      MEDICATIONS  (STANDING):  QUEtiapine 25 milliGRAM(s) Oral at bedtime    MEDICATIONS  (PRN):  diphenhydrAMINE 50 milliGRAM(s) Oral every 6 hours PRN agitation  diphenhydrAMINE   Injectable 50 milliGRAM(s) IntraMuscular once PRN severe agitation  haloperidol     Tablet 5 milliGRAM(s) Oral every 6 hours PRN agitation  haloperidol    Injectable 5 milliGRAM(s) IntraMuscular once PRN severe agitation  LORazepam     Tablet 1 milliGRAM(s) Oral every 6 hours PRN anxiety  LORazepam   Injectable 2 milliGRAM(s) IntraMuscular once PRN severe agitation      Vital Signs Last 24 Hrs  T(C): 36.9 (07 May 2020 15:32), Max: 36.9 (07 May 2020 15:32)  T(F): 98.4 (07 May 2020 15:32), Max: 98.4 (07 May 2020 15:32)  HR: 75  BP: 124/87  RR: --  SpO2: --  CAPILLARY BLOOD GLUCOSE            PHYSICAL EXAM:  GENERAL: NAD, well-developed  HEAD:  Atraumatic, Normocephalic  EYES: EOMI, PERRLA, conjunctiva and sclera clear  NECK: Supple.  CHEST/LUNG: Clear to auscultation bilaterally; No wheeze  HEART: Regular rate and rhythm; No murmurs, rubs, or gallops  ABDOMEN: Soft, Nontender, Nondistended; Bowel sounds present  EXTREMITIES:  2+ Peripheral Pulses, No cyanosis, or edema  PSYCH: AAOx3  NEUROLOGY: non-focal  SKIN: No rashes or lesions    LABS:                    RADIOLOGY & ADDITIONAL TESTS:    Assessment and Plan:  24 year old female presenting today from Crisis Center to [facility] with admitting diagnosis of Severe episode of recurrent major depressive disorder, without psychotic features. Denies any medical concerns at this time. Denies any fever, chills, headache, chest pain, SOB, abdominal pain, N/V/D/C, dysuria. Physical exam unremarkable.  1)	Severe episode of recurrent major depressive disorder, without psychotic features: Follow care plan as per primary team.

## 2020-05-08 LAB
ALBUMIN SERPL ELPH-MCNC: 4.6 G/DL — SIGNIFICANT CHANGE UP (ref 3.3–5)
ALP SERPL-CCNC: 82 U/L — SIGNIFICANT CHANGE UP (ref 40–120)
ALT FLD-CCNC: 10 U/L — SIGNIFICANT CHANGE UP (ref 4–33)
ANION GAP SERPL CALC-SCNC: 12 MMO/L — SIGNIFICANT CHANGE UP (ref 7–14)
ANISOCYTOSIS BLD QL: SLIGHT — SIGNIFICANT CHANGE UP
AST SERPL-CCNC: 12 U/L — SIGNIFICANT CHANGE UP (ref 4–32)
BASOPHILS # BLD AUTO: 0.03 K/UL — SIGNIFICANT CHANGE UP (ref 0–0.2)
BASOPHILS NFR BLD AUTO: 0.9 % — SIGNIFICANT CHANGE UP (ref 0–2)
BASOPHILS NFR SPEC: 0.9 % — SIGNIFICANT CHANGE UP (ref 0–2)
BILIRUB SERPL-MCNC: 0.5 MG/DL — SIGNIFICANT CHANGE UP (ref 0.2–1.2)
BLASTS # FLD: 0 % — SIGNIFICANT CHANGE UP (ref 0–0)
BUN SERPL-MCNC: 12 MG/DL — SIGNIFICANT CHANGE UP (ref 7–23)
CALCIUM SERPL-MCNC: 9.9 MG/DL — SIGNIFICANT CHANGE UP (ref 8.4–10.5)
CHLORIDE SERPL-SCNC: 106 MMOL/L — SIGNIFICANT CHANGE UP (ref 98–107)
CHOLEST SERPL-MCNC: 129 MG/DL — SIGNIFICANT CHANGE UP (ref 120–199)
CO2 SERPL-SCNC: 24 MMOL/L — SIGNIFICANT CHANGE UP (ref 22–31)
CREAT SERPL-MCNC: 0.92 MG/DL — SIGNIFICANT CHANGE UP (ref 0.5–1.3)
EOSINOPHIL # BLD AUTO: 0.06 K/UL — SIGNIFICANT CHANGE UP (ref 0–0.5)
EOSINOPHIL NFR BLD AUTO: 1.7 % — SIGNIFICANT CHANGE UP (ref 0–6)
EOSINOPHIL NFR FLD: 0 % — SIGNIFICANT CHANGE UP (ref 0–6)
GIANT PLATELETS BLD QL SMEAR: PRESENT — SIGNIFICANT CHANGE UP
GLUCOSE SERPL-MCNC: 78 MG/DL — SIGNIFICANT CHANGE UP (ref 70–99)
HBA1C BLD-MCNC: 4.4 % — SIGNIFICANT CHANGE UP (ref 4–5.6)
HCG SERPL-ACNC: < 5 MIU/ML — SIGNIFICANT CHANGE UP
HCT VFR BLD CALC: 37.8 % — SIGNIFICANT CHANGE UP (ref 34.5–45)
HDLC SERPL-MCNC: 50 MG/DL — SIGNIFICANT CHANGE UP (ref 45–65)
HGB BLD-MCNC: 12.4 G/DL — SIGNIFICANT CHANGE UP (ref 11.5–15.5)
HYPOCHROMIA BLD QL: SLIGHT — SIGNIFICANT CHANGE UP
IMM GRANULOCYTES NFR BLD AUTO: 0.3 % — SIGNIFICANT CHANGE UP (ref 0–1.5)
LIPID PNL WITH DIRECT LDL SERPL: 73 MG/DL — SIGNIFICANT CHANGE UP
LYMPHOCYTES # BLD AUTO: 1.47 K/UL — SIGNIFICANT CHANGE UP (ref 1–3.3)
LYMPHOCYTES # BLD AUTO: 42.1 % — SIGNIFICANT CHANGE UP (ref 13–44)
LYMPHOCYTES NFR SPEC AUTO: 41.1 % — SIGNIFICANT CHANGE UP (ref 13–44)
MACROCYTES BLD QL: SLIGHT — SIGNIFICANT CHANGE UP
MCHC RBC-ENTMCNC: 29.7 PG — SIGNIFICANT CHANGE UP (ref 27–34)
MCHC RBC-ENTMCNC: 32.8 % — SIGNIFICANT CHANGE UP (ref 32–36)
MCV RBC AUTO: 90.6 FL — SIGNIFICANT CHANGE UP (ref 80–100)
METAMYELOCYTES # FLD: 0 % — SIGNIFICANT CHANGE UP (ref 0–1)
MONOCYTES # BLD AUTO: 0.26 K/UL — SIGNIFICANT CHANGE UP (ref 0–0.9)
MONOCYTES NFR BLD AUTO: 7.4 % — SIGNIFICANT CHANGE UP (ref 2–14)
MONOCYTES NFR BLD: 3.5 % — SIGNIFICANT CHANGE UP (ref 2–9)
MYELOCYTES NFR BLD: 0 % — SIGNIFICANT CHANGE UP (ref 0–0)
NEUTROPHIL AB SER-ACNC: 50 % — SIGNIFICANT CHANGE UP (ref 43–77)
NEUTROPHILS # BLD AUTO: 1.66 K/UL — LOW (ref 1.8–7.4)
NEUTROPHILS NFR BLD AUTO: 47.6 % — SIGNIFICANT CHANGE UP (ref 43–77)
NEUTS BAND # BLD: 0 % — SIGNIFICANT CHANGE UP (ref 0–6)
NRBC # BLD: 1 /100WBC — SIGNIFICANT CHANGE UP
NRBC # FLD: 0 K/UL — SIGNIFICANT CHANGE UP (ref 0–0)
OTHER - HEMATOLOGY %: 0 — SIGNIFICANT CHANGE UP
OVALOCYTES BLD QL SMEAR: SLIGHT — SIGNIFICANT CHANGE UP
PLATELET # BLD AUTO: 297 K/UL — SIGNIFICANT CHANGE UP (ref 150–400)
PLATELET COUNT - ESTIMATE: NORMAL — SIGNIFICANT CHANGE UP
PMV BLD: 10.8 FL — SIGNIFICANT CHANGE UP (ref 7–13)
POIKILOCYTOSIS BLD QL AUTO: SLIGHT — SIGNIFICANT CHANGE UP
POLYCHROMASIA BLD QL SMEAR: SLIGHT — SIGNIFICANT CHANGE UP
POTASSIUM SERPL-MCNC: 4.5 MMOL/L — SIGNIFICANT CHANGE UP (ref 3.5–5.3)
POTASSIUM SERPL-SCNC: 4.5 MMOL/L — SIGNIFICANT CHANGE UP (ref 3.5–5.3)
PROMYELOCYTES # FLD: 0 % — SIGNIFICANT CHANGE UP (ref 0–0)
PROT SERPL-MCNC: 7.7 G/DL — SIGNIFICANT CHANGE UP (ref 6–8.3)
RBC # BLD: 4.17 M/UL — SIGNIFICANT CHANGE UP (ref 3.8–5.2)
RBC # FLD: 12.4 % — SIGNIFICANT CHANGE UP (ref 10.3–14.5)
REVIEW TO FOLLOW: YES — SIGNIFICANT CHANGE UP
SODIUM SERPL-SCNC: 142 MMOL/L — SIGNIFICANT CHANGE UP (ref 135–145)
TRIGL SERPL-MCNC: 67 MG/DL — SIGNIFICANT CHANGE UP (ref 10–149)
TSH SERPL-MCNC: 0.66 UIU/ML — SIGNIFICANT CHANGE UP (ref 0.27–4.2)
VARIANT LYMPHS # BLD: 3.6 % — SIGNIFICANT CHANGE UP
WBC # BLD: 3.41 K/UL — LOW (ref 3.8–10.5)
WBC # FLD AUTO: 3.41 K/UL — LOW (ref 3.8–10.5)

## 2020-05-08 PROCEDURE — 99232 SBSQ HOSP IP/OBS MODERATE 35: CPT

## 2020-05-08 PROCEDURE — 93010 ELECTROCARDIOGRAM REPORT: CPT

## 2020-05-08 RX ORDER — FLUOXETINE HCL 10 MG
10 CAPSULE ORAL DAILY
Refills: 0 | Status: DISCONTINUED | OUTPATIENT
Start: 2020-05-08 | End: 2020-05-11

## 2020-05-08 RX ORDER — CLONAZEPAM 1 MG
0.5 TABLET ORAL AT BEDTIME
Refills: 0 | Status: DISCONTINUED | OUTPATIENT
Start: 2020-05-08 | End: 2020-05-11

## 2020-05-08 RX ORDER — LANOLIN ALCOHOL/MO/W.PET/CERES
3 CREAM (GRAM) TOPICAL AT BEDTIME
Refills: 0 | Status: DISCONTINUED | OUTPATIENT
Start: 2020-05-08 | End: 2020-05-14

## 2020-05-08 RX ADMIN — Medication 1 MILLIGRAM(S): at 16:10

## 2020-05-08 RX ADMIN — Medication 0.5 MILLIGRAM(S): at 19:46

## 2020-05-09 PROCEDURE — 99232 SBSQ HOSP IP/OBS MODERATE 35: CPT

## 2020-05-09 RX ADMIN — Medication 0.5 MILLIGRAM(S): at 20:57

## 2020-05-09 RX ADMIN — Medication 3 MILLIGRAM(S): at 20:57

## 2020-05-09 RX ADMIN — Medication 10 MILLIGRAM(S): at 08:52

## 2020-05-09 RX ADMIN — Medication 1 MILLIGRAM(S): at 16:17

## 2020-05-10 PROCEDURE — 99232 SBSQ HOSP IP/OBS MODERATE 35: CPT

## 2020-05-10 RX ADMIN — Medication 10 MILLIGRAM(S): at 09:14

## 2020-05-10 RX ADMIN — Medication 50 MILLIGRAM(S): at 04:09

## 2020-05-10 RX ADMIN — Medication 1 MILLIGRAM(S): at 04:10

## 2020-05-10 RX ADMIN — Medication 0.5 MILLIGRAM(S): at 20:58

## 2020-05-10 RX ADMIN — Medication 3 MILLIGRAM(S): at 20:58

## 2020-05-11 PROCEDURE — 99232 SBSQ HOSP IP/OBS MODERATE 35: CPT

## 2020-05-11 RX ORDER — FLUOXETINE HCL 10 MG
20 CAPSULE ORAL DAILY
Refills: 0 | Status: DISCONTINUED | OUTPATIENT
Start: 2020-05-12 | End: 2020-05-12

## 2020-05-11 RX ORDER — CLONAZEPAM 1 MG
1 TABLET ORAL AT BEDTIME
Refills: 0 | Status: DISCONTINUED | OUTPATIENT
Start: 2020-05-11 | End: 2020-05-14

## 2020-05-11 RX ORDER — DIPHENHYDRAMINE HCL 50 MG
50 CAPSULE ORAL AT BEDTIME
Refills: 0 | Status: DISCONTINUED | OUTPATIENT
Start: 2020-05-11 | End: 2020-05-14

## 2020-05-11 RX ORDER — FLUOXETINE HCL 10 MG
10 CAPSULE ORAL ONCE
Refills: 0 | Status: COMPLETED | OUTPATIENT
Start: 2020-05-11 | End: 2020-05-11

## 2020-05-11 RX ORDER — ACETAMINOPHEN 500 MG
650 TABLET ORAL ONCE
Refills: 0 | Status: COMPLETED | OUTPATIENT
Start: 2020-05-11 | End: 2020-05-11

## 2020-05-11 RX ADMIN — Medication 1 MILLIGRAM(S): at 20:26

## 2020-05-11 RX ADMIN — Medication 1 MILLIGRAM(S): at 15:36

## 2020-05-11 RX ADMIN — Medication 50 MILLIGRAM(S): at 01:16

## 2020-05-11 RX ADMIN — Medication 10 MILLIGRAM(S): at 12:41

## 2020-05-11 RX ADMIN — Medication 10 MILLIGRAM(S): at 08:59

## 2020-05-11 RX ADMIN — Medication 3 MILLIGRAM(S): at 20:46

## 2020-05-11 RX ADMIN — Medication 50 MILLIGRAM(S): at 22:11

## 2020-05-12 PROCEDURE — 99232 SBSQ HOSP IP/OBS MODERATE 35: CPT

## 2020-05-12 RX ORDER — FLUOXETINE HCL 10 MG
30 CAPSULE ORAL DAILY
Refills: 0 | Status: DISCONTINUED | OUTPATIENT
Start: 2020-05-12 | End: 2020-05-14

## 2020-05-12 RX ADMIN — Medication 1 MILLIGRAM(S): at 20:00

## 2020-05-12 RX ADMIN — Medication 20 MILLIGRAM(S): at 09:07

## 2020-05-12 RX ADMIN — Medication 50 MILLIGRAM(S): at 23:26

## 2020-05-12 RX ADMIN — Medication 3 MILLIGRAM(S): at 20:00

## 2020-05-13 PROCEDURE — 99232 SBSQ HOSP IP/OBS MODERATE 35: CPT

## 2020-05-13 RX ORDER — LANOLIN ALCOHOL/MO/W.PET/CERES
1 CREAM (GRAM) TOPICAL
Qty: 0 | Refills: 0 | DISCHARGE
Start: 2020-05-13

## 2020-05-13 RX ORDER — CLONAZEPAM 1 MG
1 TABLET ORAL
Qty: 30 | Refills: 0
Start: 2020-05-13 | End: 2020-06-11

## 2020-05-13 RX ORDER — FLUOXETINE HCL 10 MG
3 CAPSULE ORAL
Qty: 90 | Refills: 0
Start: 2020-05-13 | End: 2020-06-11

## 2020-05-13 RX ADMIN — Medication 50 MILLIGRAM(S): at 22:20

## 2020-05-13 RX ADMIN — Medication 3 MILLIGRAM(S): at 20:46

## 2020-05-13 RX ADMIN — Medication 30 MILLIGRAM(S): at 10:03

## 2020-05-13 RX ADMIN — Medication 1 MILLIGRAM(S): at 20:46

## 2020-05-13 RX ADMIN — Medication 250 MILLIGRAM(S): at 15:28

## 2020-05-14 VITALS — TEMPERATURE: 98 F | RESPIRATION RATE: 18 BRPM

## 2020-05-14 PROCEDURE — 99238 HOSP IP/OBS DSCHRG MGMT 30/<: CPT

## 2020-05-14 RX ORDER — FLUOXETINE HCL 10 MG
1 CAPSULE ORAL
Qty: 30 | Refills: 0
Start: 2020-05-14 | End: 2020-06-12

## 2020-05-14 RX ADMIN — Medication 30 MILLIGRAM(S): at 08:41

## 2020-05-14 RX ADMIN — Medication 1 MILLIGRAM(S): at 11:23

## 2020-05-20 ENCOUNTER — OUTPATIENT (OUTPATIENT)
Dept: OUTPATIENT SERVICES | Facility: HOSPITAL | Age: 25
LOS: 1 days | Discharge: STILL ADMITTED OR ADMINISTRATIVE | End: 2020-05-20
Payer: MEDICAID

## 2020-05-20 DIAGNOSIS — O03.9 COMPLETE OR UNSPECIFIED SPONTANEOUS ABORTION WITHOUT COMPLICATION: Chronic | ICD-10-CM

## 2020-05-20 PROCEDURE — 90839 PSYTX CRISIS INITIAL 60 MIN: CPT

## 2020-05-21 DIAGNOSIS — F33.9 MAJOR DEPRESSIVE DISORDER, RECURRENT, UNSPECIFIED: ICD-10-CM

## 2020-05-22 ENCOUNTER — OUTPATIENT (OUTPATIENT)
Dept: OUTPATIENT SERVICES | Facility: HOSPITAL | Age: 25
LOS: 1 days | Discharge: HOME | End: 2020-05-22

## 2020-05-22 DIAGNOSIS — O03.9 COMPLETE OR UNSPECIFIED SPONTANEOUS ABORTION WITHOUT COMPLICATION: Chronic | ICD-10-CM

## 2020-05-26 PROCEDURE — 90792 PSYCH DIAG EVAL W/MED SRVCS: CPT

## 2020-06-16 DIAGNOSIS — F32.2 MAJOR DEPRESSIVE DISORDER, SINGLE EPISODE, SEVERE WITHOUT PSYCHOTIC FEATURES: ICD-10-CM

## 2020-12-29 ENCOUNTER — OUTPATIENT (OUTPATIENT)
Dept: OUTPATIENT SERVICES | Facility: HOSPITAL | Age: 25
LOS: 1 days | Discharge: HOME | End: 2020-12-29

## 2020-12-29 DIAGNOSIS — O03.9 COMPLETE OR UNSPECIFIED SPONTANEOUS ABORTION WITHOUT COMPLICATION: Chronic | ICD-10-CM

## 2020-12-29 DIAGNOSIS — F32.2 MAJOR DEPRESSIVE DISORDER, SINGLE EPISODE, SEVERE WITHOUT PSYCHOTIC FEATURES: ICD-10-CM

## 2021-02-02 ENCOUNTER — OUTPATIENT (OUTPATIENT)
Dept: OUTPATIENT SERVICES | Facility: HOSPITAL | Age: 26
LOS: 1 days | Discharge: HOME | End: 2021-02-02

## 2021-02-02 DIAGNOSIS — O03.9 COMPLETE OR UNSPECIFIED SPONTANEOUS ABORTION WITHOUT COMPLICATION: Chronic | ICD-10-CM

## 2021-03-23 NOTE — OB RN DELIVERY SUMMARY - NS_FORCEPSATTEMPT_OBGYN_ALL_OB
What Type Of Note Output Would You Prefer (Optional)?: Standard Output Hpi Title: Evaluation of Skin Lesions Forceps were not used

## 2021-03-25 DIAGNOSIS — F32.9 MAJOR DEPRESSIVE DISORDER, SINGLE EPISODE, UNSPECIFIED: ICD-10-CM

## 2021-10-07 ENCOUNTER — APPOINTMENT (OUTPATIENT)
Dept: INTERNAL MEDICINE | Facility: CLINIC | Age: 26
End: 2021-10-07
Payer: MEDICAID

## 2021-10-07 VITALS
DIASTOLIC BLOOD PRESSURE: 77 MMHG | HEART RATE: 97 BPM | HEIGHT: 63 IN | BODY MASS INDEX: 29.41 KG/M2 | TEMPERATURE: 98 F | WEIGHT: 166 LBS | SYSTOLIC BLOOD PRESSURE: 112 MMHG | OXYGEN SATURATION: 99 %

## 2021-10-07 DIAGNOSIS — M25.561 PAIN IN RIGHT KNEE: ICD-10-CM

## 2021-10-07 DIAGNOSIS — Z00.00 ENCOUNTER FOR GENERAL ADULT MEDICAL EXAMINATION W/OUT ABNORMAL FINDINGS: ICD-10-CM

## 2021-10-07 DIAGNOSIS — Z78.9 OTHER SPECIFIED HEALTH STATUS: ICD-10-CM

## 2021-10-07 DIAGNOSIS — U07.1 COVID-19: ICD-10-CM

## 2021-10-07 PROCEDURE — 99385 PREV VISIT NEW AGE 18-39: CPT | Mod: 25

## 2021-10-07 RX ORDER — MEDROXYPROGESTERONE ACETATE 10 MG/1
10 TABLET ORAL
Qty: 10 | Refills: 0 | Status: DISCONTINUED | COMMUNITY
Start: 2021-06-01

## 2021-10-07 RX ORDER — FLUOXETINE HYDROCHLORIDE 40 MG/1
40 CAPSULE ORAL
Qty: 30 | Refills: 0 | Status: DISCONTINUED | COMMUNITY
Start: 2021-05-14

## 2021-10-07 RX ORDER — IBUPROFEN 800 MG/1
800 TABLET ORAL
Qty: 30 | Refills: 0 | Status: DISCONTINUED | COMMUNITY
Start: 2021-06-01

## 2021-10-07 RX ORDER — HYDROXYZINE HYDROCHLORIDE 25 MG/1
25 TABLET ORAL TWICE DAILY
Qty: 180 | Refills: 1 | Status: ACTIVE | COMMUNITY
Start: 2021-05-14

## 2021-10-07 RX ORDER — BUPROPION HYDROCHLORIDE 75 MG/1
75 TABLET, FILM COATED ORAL DAILY
Refills: 0 | Status: ACTIVE | COMMUNITY
Start: 2021-09-22

## 2021-10-07 NOTE — HEALTH RISK ASSESSMENT
[Good] : ~his/her~  mood as  good [Yes] : Yes [Monthly or less (1 pt)] : Monthly or less (1 point) [1 or 2 (0 pts)] : 1 or 2 (0 points) [Never (0 pts)] : Never (0 points) [No] : In the past 12 months have you used drugs other than those required for medical reasons? No [1] : 1) Little interest or pleasure doing things for several days (1) [2] : 2) Feeling down, depressed, or hopeless for more than half of the days (2) [PHQ-2 Positive] : PHQ-2 Positive [PHQ-9 Negative - No further assessment needed] : PHQ-9 Negative - No further assessment needed [Patient reported PAP Smear was normal] : Patient reported PAP Smear was normal [With Family] : lives with family [Unemployed] : unemployed [# Of Children ___] : has [unfilled] children [] : No [Audit-CScore] : 1 [[id number]] : 3 [FreeTextEntry2] : Teacher

## 2021-10-07 NOTE — HISTORY OF PRESENT ILLNESS
[Other: _____] : [unfilled] [de-identified] : 25 year F with PMH postpartum depression presents for initial CPE and has complaint of right knee pain since 2017. Pt denies CP/SOB, fever/chills, n/v/d/c.

## 2021-10-07 NOTE — REVIEW OF SYSTEMS
[Joint Pain] : joint pain [Joint Swelling] : joint swelling [Depression] : depression [Negative] : Heme/Lymph [Joint Stiffness] : no joint stiffness [Muscle Weakness] : no muscle weakness [Muscle Pain] : no muscle pain [Back Pain] : no back pain [Suicidal] : not suicidal [Insomnia] : no insomnia [Anxiety] : no anxiety

## 2021-10-07 NOTE — PHYSICAL EXAM
[No Acute Distress] : no acute distress [Well Nourished] : well nourished [Well Developed] : well developed [Well-Appearing] : well-appearing [Normal Sclera/Conjunctiva] : normal sclera/conjunctiva [PERRL] : pupils equal round and reactive to light [EOMI] : extraocular movements intact [Normal Outer Ear/Nose] : the outer ears and nose were normal in appearance [Normal Oropharynx] : the oropharynx was normal [Normal TMs] : both tympanic membranes were normal [No JVD] : no jugular venous distention [No Lymphadenopathy] : no lymphadenopathy [Supple] : supple [Thyroid Normal, No Nodules] : the thyroid was normal and there were no nodules present [No Respiratory Distress] : no respiratory distress  [No Accessory Muscle Use] : no accessory muscle use [Clear to Auscultation] : lungs were clear to auscultation bilaterally [Normal Rate] : normal rate  [Regular Rhythm] : with a regular rhythm [Normal S1, S2] : normal S1 and S2 [No Murmur] : no murmur heard [No Abdominal Bruit] : a ~M bruit was not heard ~T in the abdomen [No Varicosities] : no varicosities [No Edema] : there was no peripheral edema [No Palpable Aorta] : no palpable aorta [No Extremity Clubbing/Cyanosis] : no extremity clubbing/cyanosis [Soft] : abdomen soft [Non Tender] : non-tender [Non-distended] : non-distended [No Masses] : no abdominal mass palpated [No HSM] : no HSM [Normal Bowel Sounds] : normal bowel sounds [Normal Posterior Cervical Nodes] : no posterior cervical lymphadenopathy [Normal Anterior Cervical Nodes] : no anterior cervical lymphadenopathy [No CVA Tenderness] : no CVA  tenderness [No Spinal Tenderness] : no spinal tenderness [No Joint Swelling] : no joint swelling [Grossly Normal Strength/Tone] : grossly normal strength/tone [No Rash] : no rash [Coordination Grossly Intact] : coordination grossly intact [No Focal Deficits] : no focal deficits [Normal Gait] : normal gait [Normal Affect] : the affect was normal [Normal Insight/Judgement] : insight and judgment were intact [de-identified] : no grinding or clicking noted with right knee movement, no significant swelling.

## 2021-10-07 NOTE — PLAN
[FreeTextEntry1] : Routine blood work and urine today. Refer to orthopedics - Dr. John Cardozo. Pt advised to sign up for [facility] portal to review labs and communicate any questions or concerns directly. Yearly physical and return as needed for illness, medication refills, and new or existing complaints.

## 2021-10-11 LAB
ALBUMIN SERPL ELPH-MCNC: 5.1 G/DL
ALP BLD-CCNC: 76 U/L
ALT SERPL-CCNC: 9 U/L
ANION GAP SERPL CALC-SCNC: 13 MMOL/L
APPEARANCE: CLEAR
AST SERPL-CCNC: 14 U/L
BACTERIA: NEGATIVE
BASOPHILS # BLD AUTO: 0.02 K/UL
BASOPHILS NFR BLD AUTO: 0.4 %
BILIRUB SERPL-MCNC: 0.4 MG/DL
BILIRUBIN URINE: NEGATIVE
BLOOD URINE: NEGATIVE
BUN SERPL-MCNC: 9 MG/DL
CALCIUM SERPL-MCNC: 9.8 MG/DL
CHLORIDE SERPL-SCNC: 104 MMOL/L
CHOLEST SERPL-MCNC: 136 MG/DL
CO2 SERPL-SCNC: 21 MMOL/L
COLOR: YELLOW
CREAT SERPL-MCNC: 0.76 MG/DL
EOSINOPHIL # BLD AUTO: 0.1 K/UL
EOSINOPHIL NFR BLD AUTO: 2.1 %
ESTIMATED AVERAGE GLUCOSE: 91 MG/DL
GLUCOSE QUALITATIVE U: NEGATIVE
GLUCOSE SERPL-MCNC: 86 MG/DL
HBA1C MFR BLD HPLC: 4.8 %
HCT VFR BLD CALC: 40.1 %
HDLC SERPL-MCNC: 59 MG/DL
HGB BLD-MCNC: 13.1 G/DL
HIV1+2 AB SPEC QL IA.RAPID: NONREACTIVE
HYALINE CASTS: 5 /LPF
IMM GRANULOCYTES NFR BLD AUTO: 0.2 %
KETONES URINE: NEGATIVE
LDLC SERPL CALC-MCNC: 68 MG/DL
LEUKOCYTE ESTERASE URINE: NEGATIVE
LYMPHOCYTES # BLD AUTO: 1.41 K/UL
LYMPHOCYTES NFR BLD AUTO: 29.9 %
MAN DIFF?: NORMAL
MCHC RBC-ENTMCNC: 30.8 PG
MCHC RBC-ENTMCNC: 32.7 GM/DL
MCV RBC AUTO: 94.4 FL
MICROSCOPIC-UA: NORMAL
MONOCYTES # BLD AUTO: 0.37 K/UL
MONOCYTES NFR BLD AUTO: 7.9 %
NEUTROPHILS # BLD AUTO: 2.8 K/UL
NEUTROPHILS NFR BLD AUTO: 59.5 %
NITRITE URINE: NEGATIVE
NONHDLC SERPL-MCNC: 77 MG/DL
PH URINE: 5.5
PLATELET # BLD AUTO: 348 K/UL
POTASSIUM SERPL-SCNC: 4.6 MMOL/L
PROT SERPL-MCNC: 7.9 G/DL
PROTEIN URINE: NEGATIVE
RBC # BLD: 4.25 M/UL
RBC # FLD: 13.2 %
RED BLOOD CELLS URINE: 1 /HPF
SODIUM SERPL-SCNC: 138 MMOL/L
SPECIFIC GRAVITY URINE: 1.02
SQUAMOUS EPITHELIAL CELLS: 5 /HPF
TRIGL SERPL-MCNC: 46 MG/DL
TSH SERPL-ACNC: 1.11 UIU/ML
UROBILINOGEN URINE: NORMAL
WBC # FLD AUTO: 4.71 K/UL
WHITE BLOOD CELLS URINE: 3 /HPF

## 2021-10-18 ENCOUNTER — APPOINTMENT (OUTPATIENT)
Dept: ORTHOPEDIC SURGERY | Facility: CLINIC | Age: 26
End: 2021-10-18
Payer: MEDICAID

## 2021-10-18 VITALS — BODY MASS INDEX: 29.41 KG/M2 | WEIGHT: 166 LBS | HEIGHT: 63 IN

## 2021-10-18 DIAGNOSIS — M22.2X1 PATELLOFEMORAL DISORDERS, RIGHT KNEE: ICD-10-CM

## 2021-10-18 DIAGNOSIS — M22.2X2 PATELLOFEMORAL DISORDERS, RIGHT KNEE: ICD-10-CM

## 2021-10-18 PROCEDURE — 99203 OFFICE O/P NEW LOW 30 MIN: CPT

## 2021-10-20 NOTE — HISTORY OF PRESENT ILLNESS
[de-identified] : Patient is here for right knee pain. She has had knee pain since 2017. She had a recent  fall around 2 months ago. She has noticed improvement since then but overall has difficulty running. She has done nothing to treat this pain. She has not had evaluation in the past. She does not work. She like to run and home exercises. \par \par The patient's past medical history, past surgical history, medications and allergies were reviewed by me today and documented accordingly. In addition, the patient's family and social history, which were noncontributory to this visit, were reviewed also. Intake form was reviewed. The patient has no family history of arthritis.

## 2021-10-20 NOTE — PHYSICAL EXAM
[de-identified] : Constitutional: Well-nourished, well-developed, No acute distress\par Respiratory:  Good respiratory effort, no SOB\par Lymphatic: No regional lymphadenopathy, no lymphedema\par Psychiatric: Pleasant and normal affect, alert and oriented x3\par Skin: Clean dry and intact B/L LE\par Musculoskeletal: normal except where as noted in regional exam\par \par Left Knee:\par APPEARANCE: no marked deformities, no swelling or malalignment\par POSITIVE TENDERNESS:  + crepitus of the anterior knee, and tenderness of patellar retinaculum\par NONTENDER: jt lines b/l, patellar & quadriceps tendons, MCL/LCL, ITB at the lateral femoral condyle & Gerdy's tubercle, pes bursa. \par ROM: full & painless, although some discomfort in deep knee flexion\par RESISTIVE TESTING: + discomfort with knee ext from deep knee flexion (stretched position), painless knee flexion. \par SPECIAL TESTS: stable v/v stress. painless grind. neg Lachman's. neg ant/post drawer. neg Jorge's. \par \par Right Knee:\par APPEARANCE: no marked deformities, no swelling or malalignment\par POSITIVE TENDERNESS:  + crepitus of the anterior knee, and tenderness of patellar retinaculum\par NONTENDER: jt lines b/l, patellar & quadriceps tendons, MCL/LCL, ITB at the lateral femoral condyle & Gerdy's tubercle, pes bursa. \par ROM: full & painless, although some discomfort in deep knee flexion\par RESISTIVE TESTING: + discomfort with knee ext from deep knee flexion (stretched position), painless knee flexion. \par SPECIAL TESTS: stable v/v stress. painless grind. neg Lachman's. neg ant/post drawer. neg Jorge's. \par \par  [de-identified] : I reviewed, interpreted and clinically correlated the following outside imaging studies,\par  Date of Exam: 05-\par  \par EXAM:  X-RAY RIGHT KNEE 3 VIEWS\par \par HISTORY:  Knee pain.\par \par TECHNIQUE:  Radiographs right knee, AP, lateral, and sunrise views.\par \par COMPARISON:  None.\par \par IMPRESSION:\par Bone mineral density appears normal.\par There is no visible acute or healing fracture. There is no post traumatic deformity.  \par Joint spaces are preserved.\par There are no appreciable lytic or blastic lesions.\par There is a small effusion.\par

## 2021-10-20 NOTE — CONSULT LETTER
[Dear  ___] : Dear  [unfilled], [Consult Letter:] : I had the pleasure of evaluating your patient, [unfilled]. [Please see my note below.] : Please see my note below. [Consult Closing:] : Thank you very much for allowing me to participate in the care of this patient.  If you have any questions, please do not hesitate to contact me. [Sincerely,] : Sincerely, [FreeTextEntry2] : PCP [FreeTextEntry3] : Guido Phan DO, ATC\par Primary Care Sports Medicine\par [facility] [place]

## 2022-01-24 NOTE — OB PROVIDER TRIAGE NOTE - NS_OBGYNHISTORY_OBGYN_ALL_OB_FT
OBGYN  -TOP x 1, D&C  -Denies any current AP complications Doxycycline Counseling:  Patient counseled regarding possible photosensitivity and increased risk for sunburn.  Patient instructed to avoid sunlight, if possible.  When exposed to sunlight, patients should wear protective clothing, sunglasses, and sunscreen.  The patient was instructed to call the office immediately if the following severe adverse effects occur:  hearing changes, easy bruising/bleeding, severe headache, or vision changes.  The patient verbalized understanding of the proper use and possible adverse effects of doxycycline.  All of the patient's questions and concerns were addressed.

## 2022-04-20 NOTE — OB RN PATIENT PROFILE - PHONE #
Drysol Counseling:  I discussed with the patient the risks of drysol/aluminum chloride including but not limited to skin rash, itching, irritation, burning. [phone]

## 2023-05-18 NOTE — DISCUSSION/SUMMARY
[de-identified] : Discussed findings of today's exam and possible causes of patient's pain.  Educated patient on their most probable diagnosis of bilateral patellofemoral syndrome, underlying etiology of weak proximal hip musculature and poor hip stability, as well as pes planus with mild foot pronation.  Reviewed possible courses of treatment, and we collaboratively decided best course of treatment at this time will include conservative management.  Patient will be started on a course of physical therapy to restore normal range of motion and strength as tolerated.  I educated the patient on the biomechanical exam that has contributed to development of patellofemoral pain syndrome.  I also advised the patient on the importance of maintaining a home exercise program and the goal of physical therapy is to teach this home exercise program for maintenance of hip strengthening exercises to provide proximal stability and decrease reactive forces at the patellofemoral compartment.  Recommend patient obtain OTC inserts, rec trial of superfeet insoles prior to considering custom made orthotics which may be of benefit if patient has decreased pain with the OTC insoles.  Follow up as needed.  Patient appreciates and agrees with current plan.\par \par I work as part of an academic orthopedic group and routinely have a physician in training (resident / fellow) working with me.  Any part of the history and physical exam performed by the physician in training was either directly reviewed and/or replicated by myself.  Any procedure performed by the physician in training was performed under my direct supervision and with the consent of the patient.\par \par This note was generated using dragon medical dictation software.  A reasonable effort has been made for proofreading its contents, but typos may still remain.  If there are any questions or points of clarification needed please notify my office.
English
